# Patient Record
(demographics unavailable — no encounter records)

---

## 2024-11-07 NOTE — PHYSICAL EXAM
[de-identified] : Constitutional: Well-nourished, well-developed, No acute distress  Respiratory:  Good respiratory effort, no SOB  Psychiatric: Pleasant and normal affect, alert and oriented x3   Foot: Skin: Clean dry and intact  Ankle: nontender, no proximal fibular pain Tenderness: ehl, ant ankle (mild) ROM: full ankle/knee rom, -10-30 DF-PF, nl eversion/inversion 5/5 EHL TA GS SILT L3-S1 2+ dp bcr  [de-identified] : The following radiographs were ordered and read by me during this patient's visit. I reviewed each radiograph in detail with the patient and discussed the findings as highlighted below.   3 views of the R ankle were obtained today that show no fracture, or dislocation. There are no degenerative changes seen. There is no malalignment. No obvious osseous abnormality. Otherwise unremarkable.

## 2024-11-07 NOTE — DISCUSSION/SUMMARY
[de-identified] : 38yF pw EHL tendinitis/ant ankle impingement - hx not consistent w stress rxn/frx despite increased workload.  No pain and running painless at this time. Discussed importance of rest between runs and rec of MRI if pain increases. The patient was extensively counseled on treatment options including but not limited to observation, rest/activity modification, bracing, anti-inflammatory medications, physical therapy, injections, and surgery.  The natural history of the disease was thoroughly explained.  We discussed that the majority of the time, this condition can be initially treated conservatively. The patient will proceed with: -PT -nsaid -pt was instructed on the importance of resting, icing and elevating to minimize swelling -RTC 6w   I have personally obtained the history, reviewed the ROS as noted, and performed the physical examination today.  The patient and I discussed the assessment and options and developed the plan.  All questions were answered and the patient stated their understanding of the treatment plan and appreciation of the visit.   My cumulative time spent on this patient's visit included: Preparation for the visit, review of the medical records, review of pertinent diagnostic studies, examination and counseling of the patient on the above diagnosis, treatment plan and prognosis, orders of diagnostic tests, medications and/or appropriate procedures and documentation in the medical records of today's visit.   Kaiden Sandoval MD

## 2024-11-07 NOTE — HISTORY OF PRESENT ILLNESS
[de-identified] : 39yo healthy F pw L ankle pain.  She is training for the [place] marathon and on a 19mi run felt some anterior ankle pain, grinding sensation. After 2d of rest, the pain resolved.  She has no pain with walking, yoga or shorter runs.  No hx of stress frx/rxn. Told tendinitis by podiatrist.